# Patient Record
Sex: FEMALE | Race: BLACK OR AFRICAN AMERICAN | NOT HISPANIC OR LATINO | Employment: FULL TIME | URBAN - METROPOLITAN AREA
[De-identification: names, ages, dates, MRNs, and addresses within clinical notes are randomized per-mention and may not be internally consistent; named-entity substitution may affect disease eponyms.]

---

## 2017-11-09 ENCOUNTER — OFFICE VISIT (OUTPATIENT)
Dept: PULMONOLOGY | Facility: CLINIC | Age: 32
End: 2017-11-09
Payer: COMMERCIAL

## 2017-11-09 ENCOUNTER — TELEPHONE (OUTPATIENT)
Dept: PULMONOLOGY | Facility: HOSPITAL | Age: 32
End: 2017-11-09

## 2017-11-09 VITALS
BODY MASS INDEX: 33.86 KG/M2 | WEIGHT: 198.31 LBS | HEART RATE: 94 BPM | HEIGHT: 64 IN | SYSTOLIC BLOOD PRESSURE: 120 MMHG | RESPIRATION RATE: 18 BRPM | OXYGEN SATURATION: 98 % | DIASTOLIC BLOOD PRESSURE: 76 MMHG

## 2017-11-09 DIAGNOSIS — E66.9 OBESITY (BMI 30.0-34.9): ICD-10-CM

## 2017-11-09 DIAGNOSIS — G47.30 SLEEP-DISORDERED BREATHING: Primary | ICD-10-CM

## 2017-11-09 DIAGNOSIS — G47.63 SLEEP-RELATED BRUXISM: ICD-10-CM

## 2017-11-09 PROCEDURE — 99203 OFFICE O/P NEW LOW 30 MIN: CPT | Mod: S$GLB,,, | Performed by: NURSE PRACTITIONER

## 2017-11-09 PROCEDURE — 99999 PR PBB SHADOW E&M-EST. PATIENT-LVL III: CPT | Mod: PBBFAC,,, | Performed by: NURSE PRACTITIONER

## 2017-11-09 RX ORDER — NORETHINDRONE 0.35 MG/1
1 TABLET ORAL DAILY
Refills: 11 | COMMUNITY
Start: 2017-10-14

## 2017-11-09 NOTE — PATIENT INSTRUCTIONS
Monitoring Your Sleep: Home Sleep Study      If you have not heard from the sleep lab within 10 days please call  [facility] at [phone].  A home sleep study tracks and records body functions while youre asleep in your own bed. The results of the study will help diagnose your sleep problem and plan your treatment.  How a home sleep study works  During a sleep study, sensors attached to your body measure your breathing, oxygen level, and other body functions. You will be shown how to attach the sensors to your body. You may also have help from a technician. At bedtime you plug the sensors into a small computer and turn it on. In the morning, you will remove the sensors and return the computer so the results can be studied.  Tips  Youll be given instructions for how to set up the sensors and the computer. Doing so will be simple. For best results:  · Go through the instructions during the day so youll be ready to use the equipment at bedtime.  · Stick to your normal routine. Ask your healthcare provider if you should do anything differently the night of the study.  · If you get up during the night, reconnect the sensors to the computer or to yourself correctly.  · Get as many hours of sleep as you can.  Getting the results   The results of your sleep study need to be scored and interpreted. Once this is done, your healthcare provider will discuss the findings with you. The sleep study results will show whether you have apnea. This is when your breathing stops temporarily many times during the night, awakening you briefly.  It can also tell how severe the apnea is. The findings help your healthcare provider know which treatment or treatments may be the right ones for you.  Date Last Reviewed: 8/9/2015 © 2000-2017 [employer]. [address] [place] [address], [place] [address]. All rights reserved. This information is not intended as a substitute for professional medical care. Always follow your  healthcare professional's instructions.        What Are Snoring and Obstructive Sleep Apnea?  If youve ever had a stuffed-up nose, you know the feeling of trying to breathe through a very narrow passageway. This is what happens in your throat when you snore. While you sleep, structures in your throat partially block your air passage, making the passage narrow and hard to breathe through. If the entire passage becomes blocked and you cant breathe at all, you have sleep apnea.      Snoring Obstructive sleep apnea   Snoring  If your throat structures are too large or the muscles relax too much during sleep, the air passage may be partially blocked. As air from the nose or mouth passes around this blockage, the throat structures vibrate, causing the familiar sound of snoring. At times, this sound can be so loud that snorers wake up others, or even themselves, during the night. Snoring gets worse as more and more of the air passage is blocked.  Obstructive sleep apnea  If the structures completely block the throat, air cant flow to the lungs at all. This is called apnea (meaning no breathing). Since the lungs arent getting fresh air, the brain tells the body to wake up just enough to tighten the muscles and unblock the air passage. With a loud gasp, breathing begins again. This process may be repeated over and over again throughout the night, making your sleep fragmented with a lighter stage of sleep. Even though you do not remember waking up many times during the night to a lighter sleep, you feel tired the next day. The lack of sleep and fresh air can also strain your lungs, heart, and other organs, leading to problems such as high blood pressure, heart attack, or stroke.  Problems in the nose and jaw  Problems in the structure of the nose may obstruct breathing. A crooked (deviated) septum or swollen turbinates can make snoring worse or lead to apnea. Also, a receding jaw may make the tongue sit too far back, so  its more likely to block the airway when youre asleep.        Date Last Reviewed: 7/18/2015  © 1800-2293 [employer]. [address] [place] [address]. All rights reserved. This information is not intended as a substitute for professional medical care. Always follow your healthcare professional's instructions.        Continuous Positive Air Pressure (CPAP)     A mask over the nose gently directs air into the throat to keep the airway open.   Continuous positive air pressure (CPAP) uses gentle air pressure to hold the airway open. CPAP is often the most effective treatment for sleep apnea and severe snoring. It works very well for many people. But keep in mind that it can take several adjustments before the setup is right for you.  How CPAP works  The CPAP machine  is a small portable pump beside the bed. The pump sends air through a hose, which is held over your nose and mouth by a mask. Mild air pressure is gently pushed through your airway. The air pressure nudges sagging tissues aside. This widens the airway so you can breathe better. CPAP may be combined with other kinds of therapy for sleep apnea.  Types of air pressure treatments  There are different types of CPAP. Your doctor or CPAP technician will help you decide which type is best for you:  · Basic CPAP keeps the pressure constant all night long.  · A bilevel device (BiPAP) provides more pressure when you breathe in and less when you breathe out. A BiPAP machine also may be set to provide automatic breaths to maintain breathing if you stop breathing while sleeping.  · An autoCPAP device automatically adjusts pressure throughout the night and in response to changes such as body position, sleep stage, and snoring.  Date Last Reviewed: 8/10/2015  © 5848-6596 [employer]. [address] [place], [place] [id number]. All rights reserved. This information is not intended as a substitute for professional medical care. Always  follow your healthcare professional's instructions.

## 2017-11-09 NOTE — PROGRESS NOTES
Subjective:      Patient ID: Jaky Aguilar is a 32 y.o. female.    Patient Active Problem List   Diagnosis    Supervision of normal pregnancy    H/O:     done 14    Normal delivery    Outcome of delivery, single liveborn    Perineal laceration with delivery, second degree, delivered    Obesity (BMI 30.0-34.9)    Sleep-related bruxism     she has been referred by Self, [facility] for evaluation and management for   Chief Complaint   Patient presents with    Sleep Apnea     Chief Complaint: Sleep Apnea    HPI:  She presents for a sleep evaluation self referred since she has bruxism and going thorough her night splints more often than expected and dentist told she is wearing her teeth too much and needs custom splint  And patient wanted sleep evaluation before spending money on a night splint (advised patient she needed treatment for teeth grinding even if has diagnosis of sleep apnea.   Patient has observed snoring, tossing and turning, decreased memory, decreased concentration, excessive daytime sleepiness, feels sleepy during the day.  Patient reports non restful sleep.  She denies morning headache.   She reports day time napping; duration 2 Hours  She denies recent weight gain.  Cardiovascular risk factors: obesity  Bed time is 1000 - 1100  Wake time is 0500  Sleep onset is within  15 Minutes.  Sleep maintenance difficulties related to non-restful sleep  Wake after sleep onset occurs two times a night.  Nocturia occurs two times a night,   Sleep aids :  NO  Dry mouth :  NO  Sleep walking:  NO  Sleep talking : YES  Sleep eating: NO  Vivid Dreams :  NO  Cataplexy :  NO    [place] sleepiness score was 17.  Neck circumference is 36.5 cm. (14.2 inches).  Mallampati score 3    STOP - BANG Questionnaire:   1. Snoring : Do you snore loudly ?     YES  2. Tired : Do you often feel tired, fatigued, or sleepy during daytime?   YES  3. Observed: Has anyone observed you stop breathing  during your sleep?    NO  4. Blood pressure : Do you have or are you being treated for high blood pressure?   NO  5. BMI :BMI more than 35 kg/m2?   NO  6. Age : Age over 50 yr old?   NO  7. Neck circumference: Neck circumference greater than 40 cm?   NO  8. Gender: Gender male?   NO    SCORE: 2    High risk of ISHAAN: Yes 5 - 8  Intermediate risk of ISHAAN: Yes 3 - 4  Low risk of ISHAAN: Yes 0 - 2    Previous Report Reviewed: office notes     Past Medical History: The following portions of the patient's history were reviewed and updated as appropriate:   She  has a past surgical history that includes [place] tooth extraction; Cervical biopsy w/ loop electrode excision; and  section.  Her family history is not on file.  She  reports that she has never smoked. She has never used smokeless tobacco. She reports that she drinks alcohol. She reports that she does not use drugs.  She has a current medication list which includes the following prescription(s): omeprazole, ondansetron, prenat75-iron fum-folic ac-om3, and nancy.  She has No Known Allergies..    Review of Systems   Constitutional: Negative for fever, chills, weight loss, weight gain, activity change, appetite change, fatigue and night sweats.   HENT: Negative for postnasal drip, rhinorrhea, sinus pressure, voice change and congestion.    Eyes: Negative for redness and itching.   Respiratory: Negative for snoring, cough, sputum production, chest tightness, shortness of breath, wheezing, orthopnea, asthma nighttime symptoms, dyspnea on extertion, use of rescue inhaler and somnolence.    Cardiovascular: Negative.  Negative for chest pain, palpitations and leg swelling.   Genitourinary: Negative for difficulty urinating and hematuria.   Endocrine: Negative for cold intolerance and heat intolerance.    Musculoskeletal: Negative for arthralgias, gait problem, joint swelling and myalgias.   Skin: Negative.    Gastrointestinal: Negative for nausea, vomiting, abdominal  "pain and acid reflux.   Neurological: Negative for dizziness, weakness, light-headedness and headaches.   Hematological: Negative for adenopathy. No excessive bruising.   All other systems reviewed and are negative.     Objective:     Physical Exam   Constitutional: She is oriented to person, place, and time. She appears well-developed and well-nourished. She is active and cooperative.  Non-toxic appearance. She does not appear ill. No distress.   HENT:   Head: Normocephalic.   Right Ear: External ear normal.   Left Ear: External ear normal.   Nose: Nose normal.   Mouth/Throat: Oropharynx is clear and moist. No oropharyngeal exudate.   Eyes: Conjunctivae are normal.   Neck: Normal range of motion. Neck supple.   Cardiovascular: Normal rate, regular rhythm, normal heart sounds and intact distal pulses.    Pulmonary/Chest: Effort normal and breath sounds normal. No stridor.   Abdominal: Soft.   Musculoskeletal: Normal range of motion.   Lymphadenopathy:     She has no cervical adenopathy.   Neurological: She is alert and oriented to person, place, and time.   Skin: Skin is warm and dry.   Psychiatric: She has a normal mood and affect. Her behavior is normal. Judgment and thought content normal.   Vitals reviewed.    /76 (BP Location: Right arm, Patient Position: Sitting)   Pulse 94   Resp 18   Ht 5' 3.6" (1.615 m)   Wt 89.9 kg (198 lb 4.9 oz)   SpO2 98%   BMI 34.47 kg/m²     Personal Diagnostic Review  Review of labs, xray's, cardiology reports.     Assessment:     1. Sleep-disordered breathing    2. Obesity (BMI 30.0-34.9)    3. Sleep-related bruxism      Orders Placed This Encounter   Procedures    Home Sleep Studies     Standing Status:   Future     Standing Expiration Date:   11/9/2018     Plan:     Discussed diagnosis, its evaluation, treatment and usual course. All questions answered.    Problem List Items Addressed This Visit     Obesity (BMI 30.0-34.9)     Encouraged calorie reduction and 30 " minutes of exercise daily. Discussed impact of obesity on general health.             Sleep-related bruxism     Has night oral appliance otc, thinking about obtaining custom from dentist.            Other Visit Diagnoses     Sleep-disordered breathing    -  Primary    snoring, daytime sleepiness proceed with home sleep testing.    Relevant Orders    Home Sleep Studies        TIME SPENT WITH PATIENT:   Time spent 35 minutes in face to face  discussion concerning diagnosis, prognosis, review of lab and test results, benefits of treatment as well as management of disease, counseling of patient and coordination of care between various health  care providers . Greater than half the time spent was used for coordination of care and counseling of patient.     Return for Sleep Study Follow Up.

## 2019-06-26 ENCOUNTER — OFFICE VISIT (OUTPATIENT)
Dept: OBSTETRICS AND GYNECOLOGY | Facility: CLINIC | Age: 34
End: 2019-06-26
Payer: COMMERCIAL

## 2019-06-26 VITALS
SYSTOLIC BLOOD PRESSURE: 147 MMHG | HEART RATE: 81 BPM | WEIGHT: 206 LBS | BODY MASS INDEX: 35.81 KG/M2 | DIASTOLIC BLOOD PRESSURE: 98 MMHG

## 2019-06-26 DIAGNOSIS — N89.8 VAGINAL DISCHARGE: Primary | ICD-10-CM

## 2019-06-26 DIAGNOSIS — N89.8 VAGINAL ODOR: ICD-10-CM

## 2019-06-26 PROCEDURE — 99213 OFFICE O/P EST LOW 20 MIN: CPT | Mod: S$GLB,,, | Performed by: OBSTETRICS & GYNECOLOGY

## 2019-06-26 PROCEDURE — 3008F BODY MASS INDEX DOCD: CPT | Mod: CPTII,S$GLB,, | Performed by: OBSTETRICS & GYNECOLOGY

## 2019-06-26 PROCEDURE — 99213 PR OFFICE/OUTPT VISIT, EST, LEVL III, 20-29 MIN: ICD-10-PCS | Mod: S$GLB,,, | Performed by: OBSTETRICS & GYNECOLOGY

## 2019-06-26 PROCEDURE — 3008F PR BODY MASS INDEX (BMI) DOCUMENTED: ICD-10-PCS | Mod: CPTII,S$GLB,, | Performed by: OBSTETRICS & GYNECOLOGY

## 2019-06-26 NOTE — PROGRESS NOTES
Subjective:       Patient ID: Jaky Aguilar is a 33 y.o. female.    Chief Complaint: No chief complaint on file.    Patient is here complaining of chronic VV time symptoms states she always has a horrible odor and discharge especially after intercourse she does not douche    Review of Systems   Constitutional: Negative for activity change, appetite change, chills, fever and unexpected weight change.   HENT: Negative for congestion, dental problem, facial swelling, hearing loss and mouth sores.    Respiratory: Negative for apnea, chest tightness, shortness of breath and wheezing.    Cardiovascular: Negative for chest pain and leg swelling.   Gastrointestinal: Negative for abdominal distention, abdominal pain, anal bleeding, blood in stool, constipation, diarrhea, nausea, rectal pain and vomiting.   Genitourinary: Negative for decreased urine volume, difficulty urinating, dyspareunia, dysuria, enuresis, flank pain, frequency, genital sores, hematuria, menstrual problem, pelvic pain, urgency, vaginal bleeding, vaginal discharge and vaginal pain.   Musculoskeletal: Negative for arthralgias, back pain, joint swelling, myalgias and neck pain.   Skin: Negative for color change, pallor, rash and wound.   Allergic/Immunologic: Negative for immunocompromised state.   Neurological: Negative for dizziness, light-headedness and headaches.   Hematological: Negative for adenopathy. Does not bruise/bleed easily.   Psychiatric/Behavioral: Negative for agitation, behavioral problems, confusion, sleep disturbance and suicidal ideas. The patient is not nervous/anxious and is not hyperactive.        Objective:      Physical Exam   Genitourinary:   Genitourinary Comments: Vagina is slightly red and no detected significant odor slight.  At this time by barium was normal   Musculoskeletal: Tenderness: every time she has intercourse early stye every 4 days.       Assessment:       1. Vaginal discharge    2. Vaginal odor         Plan:       Flagyl 500 mg b.i.d. for 6 days culture is a vagina were taken and then Metrogel

## 2019-06-28 LAB
C TRACH RRNA SPEC QL NAA+PROBE: NOT DETECTED
COMMENT: NORMAL
N GONORRHOEA RRNA SPEC QL NAA+PROBE: NOT DETECTED
T VAGINALIS RRNA SPEC QL NAA+PROBE: NOT DETECTED